# Patient Record
(demographics unavailable — no encounter records)

---

## 2024-11-04 NOTE — REVIEW OF SYSTEMS
[As Noted in HPI] : as noted in HPI [Negative] : Constitutional [Fever] : no fever [Seizures] : no convulsions

## 2024-11-04 NOTE — REASON FOR VISIT
[Parent] : parent [Follow-Up: _____] : a [unfilled] follow-up visit [[employer] ] : provided by [employer]   [Interpreters_IDNumber] : [id number]

## 2024-11-04 NOTE — ASSESSMENT
[FreeTextEntry1] : 29M w/ cerebral palsy who was referred to movement disorder clinic for Botox injections for his b/l UE contractures. Pt has been on baclofen and physical therapy. Insurance did not approve his Botox injection.   Impression: cerebral palsy with b/l UE contractures  Plan: [] Increase baclofen to 20mg TID (family counseled regarding possible SEs) and continue physical therapy for spasticity [] Trihexyphenidyl could be tried for neck stiffness if no improvement with Baclofen [] F/u in Movement Disorders if Botox injections are approved by the insurance, otherwise gen neuro clinic [] We will attempt to obtain botulinum toxin and administer at next appointment

## 2024-11-04 NOTE — REASON FOR VISIT
[Parent] : parent [Follow-Up: _____] : a [unfilled] follow-up visit [[employer] ] : provided by [employer]   [Interpreters_IDNumber] : [id number]

## 2024-11-04 NOTE — PHYSICAL EXAM
[General Appearance - Alert] : alert [3+] : Ankle jerk left 3+ [FreeTextEntry1] : The patient was alert, non-verbal at baseline, unable to follow command, wheelchair bound Mild tightness in the neck,tilt present Cranial nerves could not be fully evaluated, but pupils are B/L equal and reacting to light, no facial asymmetry, no tongue atrophy or fasciuculation. Motor System: increased tone and Contracture in all 4 limbs (most in LUE), power could not be fully evaluated,  DTRs 3+ throughout   Gait and balance could not be evaluated Sensory could not be fully evaluated, but responds to pain in all 4 limbs

## 2024-11-04 NOTE — HISTORY OF PRESENT ILLNESS
[FreeTextEntry1] : 10/31/24- This 29-year-old man with PMHx significant for cerebral palsy with B/L UE contracture (RUE>LUE) on Baclofen 10mg TID was referred to movement disorder clinic for Botox injection.  was used and the mother was at the bedside with the patient ( ID [id number]).  Insurance denied to pay for the Botox injection for him, there was no Botox available today for the patient. The mother reported that there was no changes in the spasticity in both upper limbs. The patient is currently taking Baclofen 10mg TID.   Initial visit (3/26/2024): 28M w/ cerebral palsy is referred from PCP to neurology clinic for Botox injections for his b/l UE contractures. Hx obtained by mother at bedside. Per mother, pt reports that RUE hurts more than LUE, which the patient communicates verbally. At baseline, pt is minimally verbal (able to say "mommy", "crazy", call people by their names, knows many words but not complete sentences), not sure if able to follow commands, wheelchair bound. Pt has been on baclofen for about 5 years, unclear if any improvement in contractures, no drowsiness. Pt also has physical therapy every 8 days for the last 3 years.

## 2024-11-04 NOTE — HISTORY OF PRESENT ILLNESS
[FreeTextEntry1] : 10/31/24- This 29-year-old man with PMHx significant for cerebral palsy with B/L UE contracture (RUE>LUE) on Baclofen 10mg TID was referred to movement disorder clinic for Botox injection.  was used and the mother was at the bedside with the patient ( ID [id number]).  Insurance denied to pay for the Botox injection for him, there was no Botox available today for the patient. The mother reported that there was no changes in the spasticity in both upper limbs. The patient is currently taking Baclofen 10mg TID.   Initial visit (3/26/2024): 28M w/ cerebral palsy is referred from PCP to neurology clinic for Botox injections for his b/l UE contractures. Hx obtained by mother at bedside. Per mother, pt reports that RUE hurts more than LUE, which the patient communicates verbally. At baseline, pt is minimally verbal (able to say "mommy", "crazy", call people by their names, knows many words but not complete sentences), not sure if able to follow commands, wheelchair bound. Pt has been on baclofen for about 5 years, unclear if any improvement in contractures, no drowsiness. Pt also has physical therapy every 8 days for the last 3 years.